# Patient Record
Sex: MALE | Employment: FULL TIME | URBAN - METROPOLITAN AREA
[De-identification: names, ages, dates, MRNs, and addresses within clinical notes are randomized per-mention and may not be internally consistent; named-entity substitution may affect disease eponyms.]

---

## 2018-09-13 ENCOUNTER — OFFICE VISIT (OUTPATIENT)
Dept: PSYCHIATRY | Facility: CLINIC | Age: 28
End: 2018-09-13
Attending: NURSE PRACTITIONER
Payer: COMMERCIAL

## 2018-09-13 VITALS — HEART RATE: 60 BPM | SYSTOLIC BLOOD PRESSURE: 115 MMHG | WEIGHT: 236.6 LBS | DIASTOLIC BLOOD PRESSURE: 72 MMHG

## 2018-09-13 DIAGNOSIS — F41.9 ANXIETY: Primary | ICD-10-CM

## 2018-09-13 PROCEDURE — G0463 HOSPITAL OUTPT CLINIC VISIT: HCPCS | Mod: ZF

## 2018-09-13 RX ORDER — PROPRANOLOL HYDROCHLORIDE 10 MG/1
10 TABLET ORAL 3 TIMES DAILY
Qty: 90 TABLET | Refills: 0 | Status: SHIPPED | OUTPATIENT
Start: 2018-09-13

## 2018-09-13 ASSESSMENT — PAIN SCALES - GENERAL: PAINLEVEL: NO PAIN (0)
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
## 2018-09-13 NOTE — MR AVS SNAPSHOT
After Visit Summary   9/13/2018    Henry Bella    MRN: [id number]           Patient Information     Date Of Birth          1990        Visit Information        Provider Department      9/13/2018 9:30 AM Oneil Escoto APRN [facility]        Today's Diagnoses     Anxiety    -  1      Care Instructions    Thank you for coming to the PSYCHIATRY CLINIC.    Lab Testing:  If you had lab testing today and your results are reassuring or normal they will be mailed to you or sent through [employer] within 7 days.   If the lab tests need quick action we will call you with the results.  The phone number we will call with results is # [phone] (home) . If this is not the best number please call our clinic and change the number.    Medication Refills:  If you need any refills please call your pharmacy and they will contact us. Our fax number for refills is [phone]. Please allow three business for refill processing.   If you need to  your refill at a new pharmacy, please contact the new pharmacy directly. The new pharmacy will help you get your medications transferred.     Scheduling:  If you have any concerns about today's visit or wish to schedule another appointment please call our office during normal business hours [phone] (8-5:00 M-F)    Contact Us:  Please call [phone] during business hours (8-5:00 M-F).  If after clinic hours, or on the weekend, please call  [phone].    Financial Assistance [phone]  [employer] Billing [phone]  [place] Billing [phone]  Medical Records [phone]      MENTAL HEALTH CRISIS NUMBERS:  [facility]:   [facility] - [phone]   Crisis Residence [facility] - [phone]   Walk-In Counseling Center [facility] - [phone]   COPE 24/7 [place] Mobile Team for Adults - [[phone]]; Child - [[phone]]     Crisis Connection - [phone]     [facility]:   [facility]  [place] - [phone]   Walk-in counseling [facility] - [phone]   Walk-in counseling [facility] - [phone]   Crisis Residence [facility] Doreen Dias [facility] - [phone]   Urgent Care Adult Mental Health:   --Drop-in, 24/7 crisis line, and [place] Mobile Team [[phone]]    CRISIS TEXT LINE: Text [phone] from anywhere, anytime, any crisis 24/7;    OR SEE www.crisistextline.org     Poison Control Center - [phone]    CHILD: [facility] needs assessment team - [phone]     [facility] Lifeline - [phone]; or [employer] Lifeline - 1-905.720.5354    If you have a medical emergency please call 911or go to the nearest ER.                    _____________________________________________    Again thank you for choosing PSYCHIATRY CLINIC and please let us know how we can best partner with you to improve you and your family's health.  You may be receiving a survey in the mail regarding this appointment. We would love to have your feedback, both positive and negative, so please fill out the survey and return it using the provided envelope. The survey is done by an external company, so your answers are anonymous.             Follow-ups after your visit        Your next 10 appointments already scheduled     Oct 16, 2018  4:30 PM CDT   Adult Med Follow UP with LEONIDAS Carrasco [facility]   Psychiatry Clinic ([facility])    [address]  [address] [address] [phone]              Who to contact     Please call your clinic at [phone] to:    Ask questions about your health    Make or cancel appointments    Discuss your medicines    Learn about your test results    Speak to your doctor            Additional Information About Your Visit        [employer] Information     Tamarac is an electronic gateway that provides easy, online access to your medical records. With Tamarac, you can request a clinic  appointment, read your test results, renew a prescription or communicate with your care team.     To sign up for [employer] visit the website at [email or web address]   You will be asked to enter the access code listed below, as well as some personal information. Please follow the directions to create your username and password.     Your access code is: [email or web address]  Expires: 2018  8:56 AM     Your access code will  in 90 days. If you need help or a new code, please contact your [facility] or call [phone] for assistance.        Care EveryWhere ID     This is your Care EveryWhere ID. This could be used by other organizations to access your [place] medical records  [id number]        Your Vitals Were     Pulse                   60            Blood Pressure from Last 3 Encounters:   18 115/72    Weight from Last 3 Encounters:   18 107.3 kg (236 lb 9.6 oz)              Today, you had the following     No orders found for display         Today's Medication Changes          These changes are accurate as of 18 10:27 AM.  If you have any questions, ask your nurse or doctor.               Start taking these medicines.        Dose/Directions    propranolol 10 MG tablet   Commonly known as:  INDERAL   Used for:  Anxiety   Started by:  Oneil Escoto APRN CNP        Dose:  10 mg   Take 1 tablet (10 mg) by mouth 3 times daily   Quantity:  90 tablet   Refills:  0            Where to get your medicines      These medications were sent to [id number] [address] [address]     Phone:  [phone]     propranolol 10 MG tablet                Primary Care Provider Fax #    Physician No Ref-Primary [phone]       No address on file        Equal Access to Services     JENNIE DIAZ AH: Gene Becker, mookie jaimes, fernando carlson  ah. So [facility] [phone].    ATENCIÓN: Si habla glenroy, tiene a silva disposición servicios gratuitos de asistencia lingüística. Hanny al [phone].    We comply with applicable federal civil rights laws and [place] laws. We do not discriminate on the basis of race, color, national origin, age, disability, sex, sexual orientation, or gender identity.            Thank you!     Thank you for choosing PSYCHIATRY CLINIC  for your care. Our goal is always to provide you with excellent care. Hearing back from our patients is one way we can continue to improve our services. Please take a few minutes to complete the written survey that you may receive in the mail after your visit with us. Thank you!             Your Updated Medication List - Protect others around you: Learn how to safely use, store and throw away your medicines at www.disposemymeds.org.          This list is accurate as of 9/13/18 10:27 AM.  Always use your most recent med list.                   Brand Name Dispense Instructions for use Diagnosis    clonazePAM 0.25 MG Tbdp ODT tab    klonoPIN     Take by mouth daily as needed        propranolol 10 MG tablet    INDERAL    90 tablet    Take 1 tablet (10 mg) by mouth 3 times daily    Anxiety       sertraline 50 MG tablet    ZOLOFT     Take by mouth daily

## 2018-09-13 NOTE — PATIENT INSTRUCTIONS
Thank you for coming to the PSYCHIATRY CLINIC.    Lab Testing:  If you had lab testing today and your results are reassuring or normal they will be mailed to you or sent through [employer] within 7 days.   If the lab tests need quick action we will call you with the results.  The phone number we will call with results is # [phone] (home) . If this is not the best number please call our clinic and change the number.    Medication Refills:  If you need any refills please call your pharmacy and they will contact us. Our fax number for refills is [phone]. Please allow three business for refill processing.   If you need to  your refill at a new pharmacy, please contact the new pharmacy directly. The new pharmacy will help you get your medications transferred.     Scheduling:  If you have any concerns about today's visit or wish to schedule another appointment please call our office during normal business hours [phone] (8-5:00 M-F)    Contact Us:  Please call [phone] during business hours (8-5:00 M-F).  If after clinic hours, or on the weekend, please call  [phone].    Financial Assistance [phone]  [employer] Billing [phone]  [employer] Billing [phone]  Medical Records [phone]      MENTAL HEALTH CRISIS NUMBERS:  [facility]:   [facility] [place] - [phone]   Crisis Residence [facility] - [phone]   Walk-In Counseling [facility] [phone]   COPE 24/7 [place] Mobile Team for Adults - [[phone]]; Child - [[phone]]     Crisis Connection - [phone]     [facility]:   [facility] - [phone]   Walk-in counseling [facility] - [phone]   Walk-in counseling [facility] - [phone]   Crisis Residence [facility] Residence - [phone]   Urgent Care Adult Mental Health:   --Drop-in, 24/7 crisis line, and [place] Mobile Team [[phone]]    CRISIS TEXT  LINE: Text [phone] from anywhere, anytime, any crisis 24/7;    OR SEE www.crisistextline.org     Poison Control Center - [phone]    CHILD: [facility] needs assessment team - [phone]     [facility] - [phone]; or [employer] Lifeline - [phone]    If you have a medical emergency please call 911or go to the nearest ER.                    _____________________________________________    Again thank you for choosing PSYCHIATRY CLINIC and please let us know how we can best partner with you to improve you and your family's health.  You may be receiving a survey in the mail regarding this appointment. We would love to have your feedback, both positive and negative, so please fill out the survey and return it using the provided envelope. The survey is done by an external company, so your answers are anonymous.

## 2018-10-05 ENCOUNTER — OFFICE VISIT (OUTPATIENT)
Dept: OPTOMETRY | Facility: CLINIC | Age: 28
End: 2018-10-05

## 2018-10-05 DIAGNOSIS — H52.13 MYOPIA, BILATERAL: Primary | ICD-10-CM

## 2018-10-05 NOTE — PROGRESS NOTES
No office visit. CL order only. One-time order only - no further orders without exam, Rx is now     Contact Lens Billing  V-Code:  - Soft spherical  Final Contact Lens Rx      Brand Base Curve Diameter Sphere   Right Biofinity 8.6 14.0 -1.25   Left Biofinity 8.6 14.0 -1.25       Expiration Date:  18         # of units: 2 boxes  Price per Unit: $52 per box    These are for cosmetic contact lenses.    Encounter Diagnosis   Name Primary?     Myopia, bilateral Yes        Date of last eye exam: 16

## 2018-10-05 NOTE — MR AVS SNAPSHOT
After Visit Summary   10/5/2018    Henry Bella    MRN: [id number]           Patient Information     Date Of Birth          1990        Visit Information        Provider Department      10/5/2018 8:00 AM Melly Mccall, [facility]        Today's Diagnoses     Myopia, bilateral    -  1       Follow-ups after your visit        Your next 10 appointments already scheduled     Oct 16, 2018  4:30 PM CDT   Adult Med Follow UP with LEONIDAS Carrasco CNP   Psychiatry Clinic ([facility])    [place] [id number]  [address] [id number] [phone]              Who to contact     Please call your clinic at [phone] to:    Ask questions about your health    Make or cancel appointments    Discuss your medicines    Learn about your test results    Speak to your doctor            Additional Information About Your Visit        MyChart Information     [employer] is an electronic gateway that provides easy, online access to your medical records. With [email or web address], you can request a clinic appointment, read your test results, renew a prescription or communicate with your care team.     To sign up for [employer] visit the website at [email or web address]   You will be asked to enter the access code listed below, as well as some personal information. Please follow the directions to create your username and password.     Your access code is: [email or web address]  Expires: 2018  8:56 AM     Your access code will  in 90 days. If you need help or a new code, please contact your [facility] or call [phone] for assistance.        Care EveryWhere ID     This is your Care EveryWhere ID. This could be used by other organizations to access your [place] medical records  [id number]         Blood Pressure from Last 3 Encounters:   No data found for BP    Weight from Last 3 Encounters:   No data found for Wt               Today, you had the following     No orders found for display       Primary Care Provider Fax #    Physician No Ref-Primary [phone]       No address on file        Equal Access to Services     JENNIE DIAZ : Hadii aad ku hadsaimaronnie Taylortianali, mookie ronalrafalha, amilcar blankenship, fernando castilloniés sherry. So [facility] [phone].    ATENCIÓN: Si habla español, tiene a silva disposición servicios gratuitos de asistencia lingüística. Llame al [phone].    We comply with applicable federal civil rights laws and [place] laws. We do not discriminate on the basis of race, color, national origin, age, disability, sex, sexual orientation, or gender identity.            Thank you!     Thank you for choosing EYE CLINIC  for your care. Our goal is always to provide you with excellent care. Hearing back from our patients is one way we can continue to improve our services. Please take a few minutes to complete the written survey that you may receive in the mail after your visit with us. Thank you!             Your Updated Medication List - Protect others around you: Learn how to safely use, store and throw away your medicines at www.disposemymeds.org.          This list is accurate as of 10/5/18 11:41 AM.  Always use your most recent med list.                   Brand Name Dispense Instructions for use Diagnosis    clonazePAM 0.25 MG Tbdp ODT tab    klonoPIN     Take by mouth daily as needed        propranolol 10 MG tablet    INDERAL    90 tablet    Take 1 tablet (10 mg) by mouth 3 times daily    Anxiety       sertraline 50 MG tablet    ZOLOFT     Take by mouth daily

## 2018-10-18 ASSESSMENT — PATIENT HEALTH QUESTIONNAIRE - PHQ9: SUM OF ALL RESPONSES TO PHQ QUESTIONS 1-9: 0

## 2018-11-01 ENCOUNTER — OFFICE VISIT (OUTPATIENT)
Dept: PSYCHIATRY | Facility: CLINIC | Age: 28
End: 2018-11-01
Attending: NURSE PRACTITIONER
Payer: COMMERCIAL

## 2018-11-01 VITALS — HEART RATE: 52 BPM | DIASTOLIC BLOOD PRESSURE: 74 MMHG | WEIGHT: 239.4 LBS | SYSTOLIC BLOOD PRESSURE: 120 MMHG

## 2018-11-01 DIAGNOSIS — F41.9 ANXIETY: Primary | ICD-10-CM

## 2018-11-01 PROCEDURE — G0463 HOSPITAL OUTPT CLINIC VISIT: HCPCS | Mod: ZF

## 2018-11-01 ASSESSMENT — PAIN SCALES - GENERAL: PAINLEVEL: NO PAIN (0)

## 2018-11-01 NOTE — MR AVS SNAPSHOT
After Visit Summary   11/1/2018    Henry Bella    MRN: [id number]           Patient Information     Date Of Birth          1990        Visit Information        Provider Department      11/1/2018 9:00 AM Oneil Escoto APRN [facility]        Today's Diagnoses     Anxiety    -  1       Follow-ups after your visit        Who to contact     Please call your clinic at [phone] to:    Ask questions about your health    Make or cancel appointments    Discuss your medicines    Learn about your test results    Speak to your doctor            Additional Information About Your Visit        Care EveryWhere ID     This is your Care EveryWhere ID. This could be used by other organizations to access your [place] medical records  [id number]        Your Vitals Were     Pulse                   52            Blood Pressure from Last 3 Encounters:   11/01/18 120/74   09/13/18 115/72    Weight from Last 3 Encounters:   11/01/18 108.6 kg (239 lb 6.4 oz)   09/13/18 107.3 kg (236 lb 9.6 oz)              Today, you had the following     No orders found for display       Primary Care Provider Fax #    Physician No Ref-Primary [phone]       No address on file        Equal Access to Services     [facility]: Hadii agnieszka perezo Sodaniella, waaxda luqadaha, qaybta kaalmada pattie, fernando valencia . So [facility] [phone].    ATENCIÓN: Si habla español, tiene a silva disposición servicios gratuitos de asistencia lingüística. Llame al [phone].    We comply with applicable federal civil rights laws and [place] laws. We do not discriminate on the basis of race, color, national origin, age, disability, sex, sexual orientation, or gender identity.            Thank you!     Thank you for choosing [facility]IN[facility]  for your care. Our goal is always to provide you with excellent care. Hearing back from our patients is one way we can continue to improve our services. Please  take a few minutes to complete the written survey that you may receive in the mail after your visit with us. Thank you!             Your Updated Medication List - Protect others around you: Learn how to safely use, store and throw away your medicines at www.disposemymeds.org.          This list is accurate as of 11/1/18 11:59 PM.  Always use your most recent med list.                   Brand Name Dispense Instructions for use Diagnosis    clonazePAM 0.25 MG Tbdp ODT tab    klonoPIN     Take by mouth daily as needed        propranolol 10 MG tablet    INDERAL    90 tablet    Take 1 tablet (10 mg) by mouth 3 times daily    Anxiety       sertraline 50 MG tablet    ZOLOFT     Take by mouth daily

## 2018-11-26 ASSESSMENT — PATIENT HEALTH QUESTIONNAIRE - PHQ9: SUM OF ALL RESPONSES TO PHQ QUESTIONS 1-9: 0

## 2019-12-18 ENCOUNTER — OFFICE VISIT (OUTPATIENT)
Dept: OPTOMETRY | Facility: CLINIC | Age: 29
End: 2019-12-18
Payer: COMMERCIAL

## 2019-12-18 DIAGNOSIS — H52.13 MYOPIA, BILATERAL: Primary | ICD-10-CM

## 2019-12-18 DIAGNOSIS — H35.53 STARGARDT'S DISEASE: ICD-10-CM

## 2019-12-18 ASSESSMENT — VISUAL ACUITY
CORRECTION_TYPE: CONTACTS
OS_CC: 20/150
METHOD: SNELLEN - LINEAR
OD_CC: 20/150

## 2019-12-18 ASSESSMENT — REFRACTION_MANIFEST
OS_CYLINDER: SPHERE
OD_SPHERE: -2.00
OS_SPHERE: -2.00
OD_CYLINDER: SPHERE

## 2019-12-18 ASSESSMENT — REFRACTION_CURRENTRX
OS_DIAMETER: 14.0
OD_BASECURVE: 8.6
OS_SPHERE: -1.25
OD_SPHERE: -1.25
OS_BRAND: BIOFINITY
OD_BRAND: BIOFINITY
OS_BASECURVE: 8.6
OD_DIAMETER: 14.0

## 2019-12-18 ASSESSMENT — TONOMETRY
OD_IOP_MMHG: 24
OS_IOP_MMHG: 22
IOP_METHOD: ICARE

## 2019-12-18 ASSESSMENT — EXTERNAL EXAM - RIGHT EYE: OD_EXAM: NORMAL

## 2019-12-18 ASSESSMENT — EXTERNAL EXAM - LEFT EYE: OS_EXAM: NORMAL

## 2019-12-18 ASSESSMENT — SLIT LAMP EXAM - LIDS
COMMENTS: NORMAL
COMMENTS: NORMAL

## 2019-12-18 ASSESSMENT — CUP TO DISC RATIO
OD_RATIO: 0.2
OS_RATIO: 0.2

## 2019-12-18 NOTE — PROGRESS NOTES
A/P  1.) Myopia each eye  -BCVA 20/100 right eye and left eye with higher minus Rx  -Good vision/comfort/fit in [employer]  -Prefers to be undercorrected in CL's for more comfortable vision. Continue with -1.25 power    2.) Stargardt's Disease  -VA stable today. Saw retina specialist at [facility] Summer 2018  -Rec repeat retinal eval with Dr. Fuller to ensure stability    Monitor here 2 years, sooner prn with change in vision    I have confirmed the patient's CC, HPI and reviewed Past Medical History, Past Surgical History, Social History, Family History, Problem List, Medication List and agree with Tech note.     Melly Mccall, MARY FAAO FSLS

## 2020-02-13 ENCOUNTER — OFFICE VISIT (OUTPATIENT)
Dept: OPHTHALMOLOGY | Facility: CLINIC | Age: 30
End: 2020-02-13
Attending: OPHTHALMOLOGY
Payer: COMMERCIAL

## 2020-02-13 DIAGNOSIS — H35.53 STARGARDT'S DISEASE: Primary | ICD-10-CM

## 2020-02-13 PROCEDURE — 92134 CPTRZ OPH DX IMG PST SGM RTA: CPT | Mod: ZF | Performed by: OPHTHALMOLOGY

## 2020-02-13 PROCEDURE — 92250 FUNDUS PHOTOGRAPHY W/I&R: CPT | Mod: ZF | Performed by: OPHTHALMOLOGY

## 2020-02-13 PROCEDURE — G0463 HOSPITAL OUTPT CLINIC VISIT: HCPCS | Mod: ZF

## 2020-02-13 ASSESSMENT — VISUAL ACUITY
OS_PH_CC: 20/125
OS_PH_CC+: -1
OD_CC: 20/125
OD_CC+: -1
METHOD: SNELLEN - LINEAR
CORRECTION_TYPE: CONTACTS
OS_CC: 20/200

## 2020-02-13 ASSESSMENT — REFRACTION_WEARINGRX
OS_CYLINDER: SPHERE
OD_CYLINDER: SPHERE
OS_SPHERE: -1.75
OD_SPHERE: -1.75

## 2020-02-13 ASSESSMENT — CONF VISUAL FIELD
OS_NORMAL: 1
OD_NORMAL: 1
METHOD: COUNTING FINGERS

## 2020-02-13 ASSESSMENT — TONOMETRY
OS_IOP_MMHG: 19
OD_IOP_MMHG: 18
IOP_METHOD: TONOPEN

## 2020-02-13 ASSESSMENT — SLIT LAMP EXAM - LIDS
COMMENTS: NORMAL
COMMENTS: NORMAL

## 2020-02-13 ASSESSMENT — CUP TO DISC RATIO
OD_RATIO: 0.2
OS_RATIO: 0.2

## 2020-02-13 ASSESSMENT — EXTERNAL EXAM - RIGHT EYE: OD_EXAM: NORMAL

## 2020-02-13 ASSESSMENT — EXTERNAL EXAM - LEFT EYE: OS_EXAM: NORMAL

## 2020-02-13 NOTE — PROGRESS NOTES
I have confirmed the patient's and reviewed Past Medical History, Past Surgical History, Social History, Family History, Problem List, Medication List and agree with Tech note.        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    Retinal Imaging:   OCT 2016  RE: significant outer retinal atrophy most prominent in the subfoveal region, focal inner retinal hyperintensity in the foveal region; + RPE irregularity; CST: 122  LE:significant outer retinal atrophy most prominent in the subfoveal region, + RPE irregularity; CST: 102    FAF  RE: mottled hyper/hypoAF, prominent foveal hypoAF  LE: mottled hyper/hypoAF    Assessment and Plan:  1.  Stargardt's Disease   - previously followed by Dr. Bethea at the [facility]   - repeat imaging today shows progression since last visit.     - Low Vision with Nohemi Blackmon OTR     return to clinic: 1 year for repeat exam    Elba Kamara MD  Ophthalmology Resident, PGY-3      ATTESTATION:  I have seen and examined the patient with Dr. Kamara and agree with the findings in this note,as well as the interpretations of the diagnostic tests.    Augusta Zelaya MD PhD.  Professor & Chair.

## 2020-02-13 NOTE — NURSING NOTE
Chief Complaints and History of Present Illnesses   Patient presents with     Retinal Evaluation     Chief Complaint(s) and History of Present Illness(es)     Retinal Evaluation     Associated symptoms: dryness.  Negative for floaters, flashes, eye pain and itching              Comments     Henry is here for a retinal check for Stargardt's disease. He wears contacts and sees Dr Mccall for those. He has been going to the [facility] but the doctor there said they don't have to travel that far for a check up. He says last August, his vision seemed to decrease, and hs not become better since then.     Nash Domínguez COT 12:22 PM February 13, 2020

## 2024-09-16 NOTE — PROGRESS NOTES
Psychiatry Clinic Progress Note                                                                   Henry Bella is a 27 year old male who prefers the name Zana and returns to the clinic for follow-up care.  Therapist: None  PCP: No Ref-Primary, Physician  Other Providers: None    Pertinent Background:  See previous notes.  Psych critical item history includes [no critical items].     Interim History                                                                                                        4, 4     The patient is a good historian, reports good treatment adherence and was last seen 9/13/18.  Since the last visit, Zana reports he has used during a stressful meeting.  He reports that it was helpful and he did not feel any intense anxiety. He continues to not endorse any baseline anxiety or depression.  He does still have Klonopin and has not used since starting Propranolol but does like knowing he has it.  Pulse was 52 today.  Advised him to be aware and cautious of how Propranolol can impact pulse.      Reports some concerns regarding possible attention deficits.  Not significant to warrant medication management    Recent Symptoms:   Depression:  not endorsed  Elevated:  none  Psychosis:  none  Anxiety:  performance based  Panic Attack:  none  Trauma Related:  none     Recent Substance Use:  No changes reported        Social/ Family History                                  [per patient report]                                 1ea,1ea   FINANCIAL SUPPORT- working       FEELS SAFE AT HOME- Yes    Medical / Surgical History                                                                                                                There is no problem list on file for this patient.      No past surgical history on file.     Medical Review of Systems                                                                                                    2,10   The remainder of the review of systems is  Chief Complaint   Patient presents with    Seizure     Pt BIB EMS from [place] [employer] [facility], where the pt works. Per report pt had 4 X Seizures PTA. Hx of same, pt might not be compliant with all seizure meds. Report that pt can be violent after coming out of seizure, EMS has pt in 2 point restraints. No injury noted, pt incontinent of urine.  PTA.      Pt given 5mg Versed PTA.    noncontributory  Allergy                                Review of patient's allergies indicates no known allergies.  Current Medications                                                                                                       Current Outpatient Prescriptions   Medication Sig Dispense Refill     clonazePAM (KLONOPIN) 0.25 MG TBDP Take by mouth daily as needed       propranolol (INDERAL) 10 MG tablet Take 1 tablet (10 mg) by mouth 3 times daily 90 tablet 0     sertraline (ZOLOFT) 50 MG tablet Take by mouth daily       Vitals                                                                                                                       3, 3   /74  Pulse 52  Wt 108.6 kg (239 lb 6.4 oz)   Mental Status Exam                                                                                    9, 14 cog gs     Alertness: alert  and oriented  Appearance: casually groomed  Behavior/Demeanor: cooperative, pleasant and calm, with good  eye contact   Speech: regular rate and rhythm  Language: no obvious problem  Psychomotor: normal or unremarkable  Mood: description consistent with euthymia  Affect: appropriate; was congruent to mood; was congruent to content  Thought Process/Associations: unremarkable  Thought Content:  Reports none;  Denies suicidal ideation and violent ideation  Perception:  Reports none;  Denies auditory hallucinations and visual hallucinations  Insight: good  Judgment: good  Cognition: (6) does  appear grossly intact; formal cognitive testing was not done  Gait/Station and/or Muscle Strength/Tone: unremarkable    Labs and Data                                                                                                                 Rating Scales:    PHQ9    PHQ9 Today:  0  PHQ-9 SCORE 9/13/2018   Total Score 0         Diagnosis and Assessment                                                                             m2, h3     Today the following issues were addressed:    1)  Social Anxiety, performance-based  2) R/O ADHD     [place] Prescription Monitoring Program [] was not checked today:  will be checked next visit.     PSYCHOTROPIC DRUG INTERACTIONS: none clinically relevant    Plan                                                                                                                    m2, h3      1) Medication Management  Continue Propranolol 10mg TID PRN for performance anxiety and situational/social anxiety.   Due to pulse being 52, Zana advised to use medication very cautiously.  If Propranolol proves to not be effective, consider  hydroxyzine.       Zana is not interested in taking scheduled medications as he does not believe his symptoms are persistent.  Only wants PRN     Prescribed Klonopin 0.25mg by another provider.     2) Diagnostic Clarification  Provided information and resources on ADHD testing        RTC: will call if needs any future appointments.  Believes symptoms are well managed.  Not taking scheduled medications  CRISIS NUMBERS:   Provided routinely in AVS.    Treatment Risk Statement:  The patient understands the risks, benefits, adverse effects and alternatives. Agrees to treatment with the capacity to do so. No medical contraindications to treatment. Agrees to call clinic for any problems. The patient understands to call 911 or go to the nearest ED if life threatening or urgent symptoms occur.       PROVIDER:  LEONIDAS Rao CNP   never used/caffeine